# Patient Record
Sex: FEMALE | Race: WHITE
[De-identification: names, ages, dates, MRNs, and addresses within clinical notes are randomized per-mention and may not be internally consistent; named-entity substitution may affect disease eponyms.]

---
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
## 2021-12-07 ENCOUNTER — HOSPITAL ENCOUNTER (EMERGENCY)
Dept: HOSPITAL 33 - ED | Age: 21
Discharge: HOME | End: 2021-12-07
Payer: SELF-PAY

## 2021-12-07 VITALS — DIASTOLIC BLOOD PRESSURE: 69 MMHG | HEART RATE: 82 BPM | SYSTOLIC BLOOD PRESSURE: 104 MMHG

## 2021-12-07 VITALS — OXYGEN SATURATION: 99 %

## 2021-12-07 DIAGNOSIS — R11.2: ICD-10-CM

## 2021-12-07 DIAGNOSIS — Z72.0: ICD-10-CM

## 2021-12-07 DIAGNOSIS — N39.0: ICD-10-CM

## 2021-12-07 DIAGNOSIS — K29.70: Primary | ICD-10-CM

## 2021-12-07 LAB
ALBUMIN SERPL-MCNC: 4.3 G/DL (ref 3.5–5)
ALP SERPL-CCNC: 49 U/L (ref 38–126)
ALT SERPL-CCNC: 17 U/L (ref 0–35)
ANION GAP SERPL CALC-SCNC: 12.8 MEQ/L (ref 5–15)
AST SERPL QL: 20 U/L (ref 14–36)
BASOPHILS # BLD AUTO: 0.04 10*3/UL (ref 0–0.4)
BASOPHILS NFR BLD AUTO: 0.3 % (ref 0–0.4)
BILIRUB BLD-MCNC: 0.4 MG/DL (ref 0.2–1.3)
BUN SERPL-MCNC: 11 MG/DL (ref 7–17)
CALCIUM SPEC-MCNC: 9.5 MG/DL (ref 8.4–10.2)
CHLORIDE SERPL-SCNC: 102 MMOL/L (ref 98–107)
CO2 SERPL-SCNC: 25 MMOL/L (ref 22–30)
CREAT SERPL-MCNC: 0.91 MG/DL (ref 0.52–1.04)
EOSINOPHIL # BLD AUTO: 0.11 10*3/UL (ref 0–0.5)
GFR SERPLBLD BASED ON 1.73 SQ M-ARVRAT: > 60 ML/MIN
GLUCOSE SERPL-MCNC: 112 MG/DL (ref 74–106)
GLUCOSE UR-MCNC: NEGATIVE MG/DL
HCT VFR BLD AUTO: 38.6 % (ref 35–47)
HGB BLD-MCNC: 12.2 GM/DL (ref 12–16)
LIPASE SERPL-CCNC: 105 U/L (ref 23–300)
LYMPHOCYTES # SPEC AUTO: 4.41 10*3/UL (ref 1–4.6)
MCH RBC QN AUTO: 28.2 PG (ref 26–32)
MCHC RBC AUTO-ENTMCNC: 31.6 G/DL (ref 32–36)
MONOCYTES # BLD AUTO: 0.82 10*3/UL (ref 0–1.3)
PLATELET # BLD AUTO: 326 K/MM3 (ref 150–450)
POTASSIUM SERPLBLD-SCNC: 3.8 MMOL/L (ref 3.5–5.1)
PROT SERPL-MCNC: 7.1 G/DL (ref 6.3–8.2)
PROT UR STRIP-MCNC: 30 MG/DL
RBC # BLD AUTO: 4.33 M/MM3 (ref 4.1–5.4)
RBC #/AREA URNS HPF: (no result) /HPF (ref 0–2)
SODIUM SERPL-SCNC: 136 MMOL/L (ref 137–145)
WBC # BLD AUTO: 12 K/MM3 (ref 4–10.5)
WBC #/AREA URNS HPF: (no result) /HPF (ref 0–5)

## 2021-12-07 PROCEDURE — 36415 COLL VENOUS BLD VENIPUNCTURE: CPT

## 2021-12-07 PROCEDURE — 96360 HYDRATION IV INFUSION INIT: CPT

## 2021-12-07 PROCEDURE — 84703 CHORIONIC GONADOTROPIN ASSAY: CPT

## 2021-12-07 PROCEDURE — 99284 EMERGENCY DEPT VISIT MOD MDM: CPT

## 2021-12-07 PROCEDURE — 83690 ASSAY OF LIPASE: CPT

## 2021-12-07 PROCEDURE — 87086 URINE CULTURE/COLONY COUNT: CPT

## 2021-12-07 PROCEDURE — 81001 URINALYSIS AUTO W/SCOPE: CPT

## 2021-12-07 PROCEDURE — 80053 COMPREHEN METABOLIC PANEL: CPT

## 2021-12-07 PROCEDURE — 96375 TX/PRO/DX INJ NEW DRUG ADDON: CPT

## 2021-12-07 PROCEDURE — 96374 THER/PROPH/DIAG INJ IV PUSH: CPT

## 2021-12-07 PROCEDURE — 36000 PLACE NEEDLE IN VEIN: CPT

## 2021-12-07 PROCEDURE — 85025 COMPLETE CBC W/AUTO DIFF WBC: CPT

## 2021-12-07 NOTE — ERPHSYRPT
- History of Present Illness


Time Seen by Provider: 12/07/21 02:10


Source: patient


Exam Limitations: no limitations


Patient Subjective Stated Complaint: pt states "I have been vomiting for the 

past 2 weeks."


Triage Nursing Assessment: pt ambulated into the er; pt is axo x4; c/o vomiting;

pt denies abd pain; pt states vomiting for the past 2 weeks; pt denies following

up with PCP; pt denies N/D; abd is round and soft; pt states last BM was 12/5; 

active bowel sounds in all quads; vitals wnl


Physician History: 


Patient is a 21-year-old female presents to our ED for evaluation of 

intermittent nausea vomiting for 2 weeks.  Patient states she was at work and 

vomited.  Patient states that her boss advised her to come to the ER to get 

checked out.  Patient denies pain.  No abdominal pain.  No chest pain.  No 

diarrhea.  No rash.  No fever.  No trauma.  Symptoms are mild to moderate in 

intensity.  No specific worsening or improving factors.  No active bleeding.  

Patient is otherwise healthy.  No significant past medical history.  Patient is 

not on blood thinners.  Patient voices no other complaints or concerns at this 

time.





Timing/Duration: today


Severity: mild


Modifying Factors: Improves With: nothing


Associated Symptoms: denies symptoms


Allergies/Adverse Reactions: 








No Known Drug Allergies Allergy (Verified 12/07/21 02:04)


   





Home Medications: 








Norethindrone AC-Eth Estradiol [Microgestin] 1 each PO DAILY 12/07/21 [History]





Hx Tetanus, Diphtheria Vaccination/Date Given: Yes


Hx Influenza Vaccination/Date Given: No


Hx Pneumococcal Vaccination/Date Given: No





Travel Risk





- International Travel


Have you traveled outside of the country in past 3 weeks: No





- Coronavirus Screening


Are you exhibiting any of the following symptoms?: Yes


Symptoms: Vomiting/Diarrhea


Close contact with a COVID-19 positive Pt in past 14-21 Days: No





- Vaccine Status


Have you recieved a Covid-19 vaccination: Yes


: Moderna





- Vaccination Dates


Date of 2cond Vaccination (if applicable): 02/21





- Review of Systems


Constitutional: No Symptoms, No Fever, No Chills


Eyes: No Symptoms


Ears, Nose, & Throat: No Symptoms


Respiratory: No Symptoms, No Cough, No Dyspnea


Cardiac: No Symptoms, No Chest Pain, No Edema, No Syncope


Abdominal/Gastrointestinal: No Symptoms, No Abdominal Pain, No Nausea, No 

Vomiting, No Diarrhea


Genitourinary Symptoms: No Symptoms, No Dysuria


Musculoskeletal: No Symptoms, No Back Pain, No Neck Pain


Skin: No Symptoms, No Rash


Neurological: No Symptoms, No Dizziness, No Focal Weakness, No Sensory Changes


Psychological: No Symptoms


Endocrine: No Symptoms


Hematologic/Lymphatic: No Symptoms


Immunological/Allergic: No Symptoms


All Other Systems: Reviewed and Negative





- Past Medical History


Pertinent Past Medical History: No


Neurological History: No Pertinent History


ENT History: No Pertinent History


Cardiac History: No Pertinent History


Respiratory History: No Pertinent History


Endocrine Medical History: No Pertinent History


Musculoskeletal History: No Pertinent History


GI Medical History: No Pertinent History


 History: No Pertinent History


Psycho-Social History: Anxiety, Depression


Female Reproductive Disorders: No Pertinent History





- Past Surgical History


Past Surgical History: No





- Social History


Smoking Status: Current every day smoker


How long have you smoked: 3 years


Exposure to second hand smoke: Yes


Drug Use: none


Patient Lives Alone: No





- Female History


Hx Pregnant Now: No





- Nursing Vital Signs


Nursing Vital Signs: 


                               Initial Vital Signs











Temperature  98.1 F   12/07/21 02:06


 


Pulse Rate  103 H  12/07/21 02:06


 


Respiratory Rate  18   12/07/21 02:06


 


Blood Pressure  132/95   12/07/21 02:06


 


O2 Sat by Pulse Oximetry  99   12/07/21 02:06








                                   Pain Scale











Pain Intensity                 0

















- Physical Exam


General Appearance: no apparent distress, alert


Eye Exam: PERRL/EOMI, eyes nml inspection


Ears, Nose, Throat Exam: normal ENT inspection, TMs normal, pharynx normal, 

moist mucous membranes


Neck Exam: normal inspection, non-tender, supple, full range of motion


Respiratory Exam: normal breath sounds, lungs clear, airway intact, No 

respiratory distress


Cardiovascular Exam: regular rate/rhythm, normal heart sounds, normal peripheral

 pulses


Gastrointestinal/Abdomen Exam: soft, normal bowel sounds, No tenderness, No mass


Back Exam: normal inspection, normal range of motion, No CVA tenderness, No 

vertebral tenderness


Extremity Exam: normal inspection, normal range of motion, pelvis stable


Neurologic Exam: alert, oriented x 3, cooperative, normal mood/affect, nml 

cerebellar function, nml station & gait, sensation nml, No motor deficits


Skin Exam: normal color, warm, dry, No rash


Lymphatic Exam: No adenopathy


**SpO2 Interpretation**: normal


SpO2: 99


O2 Delivery: Room Air





- Course


Nursing assessment & vital signs reviewed: Yes


Ordered Tests: 


                               Active Orders 24 hr











 Category Date Time Status


 


 IV Insertion STAT Care  12/07/21 02:26 Active


 


 CBC W DIFF Stat Lab  12/07/21 02:26 Completed


 


 CMP Stat Lab  12/07/21 02:26 Completed


 


 CULTURE,URINE Stat Lab  12/07/21 02:26 Received


 


 HCG,QUALITATIVE URINE Stat Lab  12/07/21 02:26 Completed


 


 LIPASE Stat Lab  12/07/21 02:26 Completed


 


 UA W/RFX UR CULTURE Stat Lab  12/07/21 02:26 Completed








Medication Summary











Generic Name Dose Route Start Last Admin





  Trade Name Freq  PRN Reason Stop Dose Admin


 


Sodium Chloride  1,000 mls @ 999 mls/hr  12/07/21 02:26  12/07/21 02:39





  Sodium Chloride 0.9% 1000 Ml  IV  12/07/21 03:26  999 mls/hr





  .Q1H1M STA   Administration














Discontinued Medications














Generic Name Dose Route Start Last Admin





  Trade Name Freq  PRN Reason Stop Dose Admin


 


Sodium Chloride  Confirm  12/07/21 02:39 





  Sodium Chloride 0.9% 1000 Ml  Administered  12/07/21 02:40 





  Dose  





  1,000 mls @ ud  





  .ROUTE  





  .STK-MED ONE  


 


Ondansetron HCl  4 mg  12/07/21 02:26  12/07/21 02:38





  Ondansetron Hcl 4 Mg/2 Ml Vial  IV  12/07/21 02:27  4 mg





  STAT ONE   Administration


 


Ondansetron HCl  Confirm  12/07/21 02:27 





  Ondansetron Hcl 4 Mg/2 Ml Vial  Administered  12/07/21 02:28 





  Dose  





  4 mg  





  .ROUTE  





  .STK-MED ONE  


 


Pantoprazole Sodium  40 mg  12/07/21 02:26  12/07/21 02:38





  Pantoprazole 40 Mg Vial  IV  12/07/21 02:27  40 mg





  STAT ONE   Administration


 


Pantoprazole Sodium  Confirm  12/07/21 02:27 





  Pantoprazole 40 Mg Vial  Administered  12/07/21 02:28 





  Dose  





  40 mg  





  IV  





  .STK-MED ONE  











Lab/Rad Data: 


                           Laboratory Result Diagrams





                                 12/07/21 02:26 





                                 12/07/21 02:26 





                               Laboratory Results











  12/07/21 12/07/21 12/07/21 Range/Units





  02:26 02:26 02:26 


 


WBC   12.0 H   (4.0-10.5)  K/mm3


 


RBC   4.33   (4.1-5.4)  M/mm3


 


Hgb   12.2   (12.0-16.0)  gm/dl


 


Hct   38.6   (35-47)  %


 


MCV   89.1   ()  fl


 


MCH   28.2   (26-32)  pg


 


MCHC   31.6 L   (32-36)  g/dl


 


RDW   12.8   (11.5-14.0)  %


 


Plt Count   326   (150-450)  K/mm3


 


MPV   9.7   (7.5-11.0)  fl


 


Gran %   55.0   (36.0-66.0)  %


 


Eos # (Auto)   0.11   (0-0.5)  


 


Absolute Lymphs (auto)   4.41   (1.0-4.6)  


 


Absolute Monos (auto)   0.82   (0.0-1.3)  


 


Lymphocytes %   36.9   (24.0-44.0)  %


 


Monocytes %   6.9   (0.0-12.0)  %


 


Eosinophils %   0.9   (0.00-5.0)  %


 


Basophils %   0.3   (0.0-0.4)  %


 


Absolute Granulocytes   6.57   (1.4-6.9)  


 


Basophils #   0.04   (0-0.4)  


 


Sodium  136 L    (137-145)  mmol/L


 


Potassium  3.8    (3.5-5.1)  mmol/L


 


Chloride  102    ()  mmol/L


 


Carbon Dioxide  25    (22-30)  mmol/L


 


Anion Gap  12.8    (5-15)  MEQ/L


 


BUN  11    (7-17)  mg/dL


 


Creatinine  0.91    (0.52-1.04)  mg/dL


 


Estimated GFR  > 60.0    ML/MIN


 


Glucose  112 H    ()  mg/dL


 


Calcium  9.5    (8.4-10.2)  mg/dL


 


Total Bilirubin  0.40    (0.2-1.3)  mg/dL


 


AST  20    (14-36)  U/L


 


ALT  17    (0-35)  U/L


 


Alkaline Phosphatase  49    ()  U/L


 


Serum Total Protein  7.1    (6.3-8.2)  g/dL


 


Albumin  4.3    (3.5-5.0)  g/dL


 


Lipase  105    ()  U/L


 


Urine Color     (YELLOW)  


 


Urine Appearance     (CLEAR)  


 


Urine pH     (5-6)  


 


Ur Specific Gravity     (1.005-1.025)  


 


Urine Protein     (Negative)  


 


Urine Ketones     (NEGATIVE)  


 


Urine Blood     (0-5)  Zion/ul


 


Urine Nitrite     (NEGATIVE)  


 


Urine Bilirubin     (NEGATIVE)  


 


Urine Urobilinogen     (0-1)  mg/dL


 


Ur Leukocyte Esterase     (NEGATIVE)  


 


Urine WBC (Auto)     (0-5)  /HPF


 


Urine RBC (Auto)     (0-2)  /HPF


 


U Epithel Cells (Auto)     (FEW)  /HPF


 


Urine Bacteria (Auto)     (NEGATIVE)  /HPF


 


Urine Mucus (Auto)     (NEGATIVE)  /HPF


 


Urine Culture Reflexed     (NO)  


 


Urine Glucose     (NEGATIVE)  mg/dL


 


Urine HCG, Qual    NEGATIVE  (Negative)  














  12/07/21 Range/Units





  02:26 


 


WBC   (4.0-10.5)  K/mm3


 


RBC   (4.1-5.4)  M/mm3


 


Hgb   (12.0-16.0)  gm/dl


 


Hct   (35-47)  %


 


MCV   ()  fl


 


MCH   (26-32)  pg


 


MCHC   (32-36)  g/dl


 


RDW   (11.5-14.0)  %


 


Plt Count   (150-450)  K/mm3


 


MPV   (7.5-11.0)  fl


 


Gran %   (36.0-66.0)  %


 


Eos # (Auto)   (0-0.5)  


 


Absolute Lymphs (auto)   (1.0-4.6)  


 


Absolute Monos (auto)   (0.0-1.3)  


 


Lymphocytes %   (24.0-44.0)  %


 


Monocytes %   (0.0-12.0)  %


 


Eosinophils %   (0.00-5.0)  %


 


Basophils %   (0.0-0.4)  %


 


Absolute Granulocytes   (1.4-6.9)  


 


Basophils #   (0-0.4)  


 


Sodium   (137-145)  mmol/L


 


Potassium   (3.5-5.1)  mmol/L


 


Chloride   ()  mmol/L


 


Carbon Dioxide   (22-30)  mmol/L


 


Anion Gap   (5-15)  MEQ/L


 


BUN   (7-17)  mg/dL


 


Creatinine   (0.52-1.04)  mg/dL


 


Estimated GFR   ML/MIN


 


Glucose   ()  mg/dL


 


Calcium   (8.4-10.2)  mg/dL


 


Total Bilirubin   (0.2-1.3)  mg/dL


 


AST   (14-36)  U/L


 


ALT   (0-35)  U/L


 


Alkaline Phosphatase   ()  U/L


 


Serum Total Protein   (6.3-8.2)  g/dL


 


Albumin   (3.5-5.0)  g/dL


 


Lipase   ()  U/L


 


Urine Color  THERESA  (YELLOW)  


 


Urine Appearance  CLOUDY  (CLEAR)  


 


Urine pH  5.0  (5-6)  


 


Ur Specific Gravity  1.031  (1.005-1.025)  


 


Urine Protein  30  (Negative)  


 


Urine Ketones  NEGATIVE  (NEGATIVE)  


 


Urine Blood  SMALL  (0-5)  Zion/ul


 


Urine Nitrite  NEGATIVE  (NEGATIVE)  


 


Urine Bilirubin  NEGATIVE  (NEGATIVE)  


 


Urine Urobilinogen  NEGATIVE  (0-1)  mg/dL


 


Ur Leukocyte Esterase  MODERATE  (NEGATIVE)  


 


Urine WBC (Auto)  6-10  (0-5)  /HPF


 


Urine RBC (Auto)  0-2  (0-2)  /HPF


 


U Epithel Cells (Auto)  MANY  (FEW)  /HPF


 


Urine Bacteria (Auto)  MODERATE  (NEGATIVE)  /HPF


 


Urine Mucus (Auto)  SLIGHT  (NEGATIVE)  /HPF


 


Urine Culture Reflexed  YES  (NO)  


 


Urine Glucose  NEGATIVE  (NEGATIVE)  mg/dL


 


Urine HCG, Qual   (Negative)  














- Progress


Progress: improved


Progress Note: 


Patient reassessed.  She feels well.  Patient tolerated p.o.  Working diagnosis 

at this time is gastritis.  Patient also has a urinary tract infection.  A 

prescription for Macrobid, Protonix and Zofran was forwarded to patient's 

pharmacy.  Patient understands that she is to maintain a clear liquid diet for 

the next several days.  Patient agrees to follow-up with her primary care doctor

 within 48 hours for reevaluation.  She voices no other complaints or concerns 

at this time.





Portions of this note were created with voice recognition technology.  There may

 be grammatical, spelling, punctuation or sound alike errors


12/07/21 03:25





Counseled pt/family regarding: lab results, diagnosis, need for follow-up





- Departure


Departure Disposition: Home


Clinical Impression: 


 UTI (urinary tract infection), Nausea and vomiting, Gastritis





Condition: Stable


Critical Care Time: No


Referrals: 


GERALD GUARDADO MD [Primary Care Provider] - Follow up/PCP as directed


Additional Instructions: 


Discharge/Care Plan





NOEMIRENEE CORONEL was seen on 12/07/21 in the Emergency Room. The patient 

was counseled regarding Diagnosis,Lab results, Imaging studies, need for follow 

up and when to return to the Emergency Room.





Prescriptions given:





Discharge Note





I have spoken with the patient and/or caregivers. I have explained the patient's

condition, diagnosis and treatment plan based on the information available to me

at this time. I have answered the patient's and/or caregiver's questions and 

addressed any concerns. The patient and/or caregivers have as good understanding

of the patient's diagnosis, condition and treatment plan as can be expected at 

this point. The vital signs have been stable. The patient's condition is stable 

and appropriate for discharge from the emergency department.





The patient will pursue further outpatient evaluation with the primary care 

physician or other designated or consulting physician as outlined in the 

discharge instructions. The patient and/or caregivers are agreeable to this plan

of care and follow-up instructions have been explained in detail. The patient 

and/or caregivers have received these instruction. The patient/and or caregivers

are aware that any significant change in condition or worsening of symptoms 

should prompt an immediate return to this or the closest emergency department or

call 911. 








Prescriptions: 


Ondansetron ODT 4 MG*** [Zofran Odt 4 mg***] 4 mg PO Q6H PRN PRN #10 tablet


 PRN Reason: Vomiting


Nitrofurantoin Macro 100 mg*** [Macrobid 100MG Capsule***] 100 mg PO BID 7 Days 

#14 cap


Famotidine 20 mg*** [Pepcid 20 MG***] 20 mg PO BID 14 Days #28 tablet

## 2024-11-15 ENCOUNTER — HOSPITAL ENCOUNTER (EMERGENCY)
Dept: HOSPITAL 33 - ED | Age: 24
Discharge: HOME | End: 2024-11-15
Payer: MEDICAID

## 2024-11-15 VITALS — HEART RATE: 75 BPM | DIASTOLIC BLOOD PRESSURE: 61 MMHG | SYSTOLIC BLOOD PRESSURE: 116 MMHG | OXYGEN SATURATION: 97 %

## 2024-11-15 VITALS — TEMPERATURE: 98 F | RESPIRATION RATE: 18 BRPM

## 2024-11-15 DIAGNOSIS — H92.01: ICD-10-CM

## 2024-11-15 DIAGNOSIS — Z33.1: ICD-10-CM

## 2024-11-15 DIAGNOSIS — H66.001: Primary | ICD-10-CM

## 2024-11-15 PROCEDURE — 99284 EMERGENCY DEPT VISIT MOD MDM: CPT

## 2024-11-15 PROCEDURE — 99283 EMERGENCY DEPT VISIT LOW MDM: CPT

## 2024-11-15 PROCEDURE — 96372 THER/PROPH/DIAG INJ SC/IM: CPT

## 2024-11-15 RX ADMIN — NEOMYCIN SULFATE, POLYMYXIN B SULFATE AND HYDROCORTISONE SCH ML: 10; 3.5; 1 SUSPENSION/ DROPS AURICULAR (OTIC) at 01:43

## 2024-11-15 RX ADMIN — CEFTRIAXONE SODIUM ONE MG: 1 INJECTION, POWDER, FOR SOLUTION INTRAMUSCULAR; INTRAVENOUS at 01:41

## 2025-01-19 ENCOUNTER — HOSPITAL ENCOUNTER (OUTPATIENT)
Dept: HOSPITAL 33 - ED | Age: 25
Setting detail: OBSERVATION
LOS: 1 days | Discharge: HOME | End: 2025-01-20
Attending: FAMILY MEDICINE | Admitting: FAMILY MEDICINE
Payer: COMMERCIAL

## 2025-01-19 DIAGNOSIS — R06.02: ICD-10-CM

## 2025-01-19 DIAGNOSIS — Z3A.26: ICD-10-CM

## 2025-01-19 DIAGNOSIS — R42: ICD-10-CM

## 2025-01-19 DIAGNOSIS — O21.2: Primary | ICD-10-CM

## 2025-01-19 LAB
ALBUMIN SERPL-MCNC: 3.7 G/DL (ref 3.5–5)
ALP SERPL-CCNC: 95 U/L (ref 38–126)
ALT SERPL-CCNC: 19 U/L (ref 0–35)
AMYLASE SERPL-CCNC: 45 U/L (ref 30–110)
ANION GAP SERPL CALC-SCNC: 12.9 MEQ/L (ref 5–15)
AST SERPL QL: 28 U/L (ref 14–36)
BASOPHILS # BLD AUTO: 0.03 X10^3/UL (ref 0.01–0.08)
BASOPHILS NFR BLD AUTO: 0.3 % (ref 0.1–1.2)
BILIRUB BLD-MCNC: 0.8 MG/DL (ref 0.2–1.3)
BUN SERPL-MCNC: 3 MG/DL (ref 7–17)
CALCIUM SPEC-MCNC: 9.8 MG/DL (ref 8.4–10.2)
CHLORIDE SERPL-SCNC: 107 MMOL/L (ref 98–107)
CO2 SERPL-SCNC: 19 MMOL/L (ref 22–30)
CREAT SERPL-MCNC: 0.51 MG/DL (ref 0.52–1.04)
EOSINOPHIL # BLD AUTO: 0.03 X10^3/UL (ref 0.04–0.36)
FLUAV AG NPH QL IA: NEGATIVE
FLUBV AG NPH QL IA: NEGATIVE
GFR SERPLBLD BASED ON 1.73 SQ M-ARVRAT: 131.9 ML/MIN
GLUCOSE SERPL-MCNC: 102 MG/DL (ref 74–106)
HCT VFR BLD AUTO: 37 % (ref 34.1–44.9)
HGB BLD-MCNC: 12.5 G/DL (ref 11.2–15.7)
IMM GRANULOCYTES # BLD: 0.06 X10^3U/L (ref 0–0.03)
IMM GRANULOCYTES NFR BLD: 0.5 % (ref 0–0.43)
LIPASE SERPL-CCNC: 78 U/L (ref 23–300)
LYMPHOCYTES # SPEC AUTO: 1.26 X10^3/UL (ref 1.18–3.74)
MCH RBC QN AUTO: 29.8 PG (ref 25.6–32.2)
MCHC RBC AUTO-ENTMCNC: 33.8 G/DL (ref 32.2–35.5)
MONOCYTES # BLD AUTO: 0.35 X10^3/UL (ref 0.24–0.86)
NRBC # BLD AUTO: 0 X10^3U/L (ref 0–0.01)
NRBC BLD AUTO-RTO: 0 % (ref 0–0.2)
PLATELET # BLD AUTO: 308 X10^3/UL (ref 182–369)
POTASSIUM SERPLBLD-SCNC: 3.3 MMOL/L (ref 3.5–5.1)
PROT SERPL-MCNC: 6.7 G/DL (ref 6.3–8.2)
RBC # BLD AUTO: 4.19 X10^6/UL (ref 3.93–5.22)
RBC # URNS HPF: (no result) /HPF (ref 0–5)
RSV AG SPEC QL IA: NEGATIVE
SARS-COV-2 AG RESP QL IA.RAPID: NEGATIVE
SODIUM SERPL-SCNC: 136 MMOL/L (ref 135–145)
WBC # BLD AUTO: 11.2 X10^3/UL (ref 3.98–10.04)
WBC URNS QL MICRO: (no result) /HPF (ref 0–5)

## 2025-01-19 PROCEDURE — 85025 COMPLETE CBC W/AUTO DIFF WBC: CPT

## 2025-01-19 PROCEDURE — 36415 COLL VENOUS BLD VENIPUNCTURE: CPT

## 2025-01-19 PROCEDURE — 96375 TX/PRO/DX INJ NEW DRUG ADDON: CPT

## 2025-01-19 PROCEDURE — 83880 ASSAY OF NATRIURETIC PEPTIDE: CPT

## 2025-01-19 PROCEDURE — G0378 HOSPITAL OBSERVATION PER HR: HCPCS

## 2025-01-19 PROCEDURE — 84484 ASSAY OF TROPONIN QUANT: CPT

## 2025-01-19 PROCEDURE — 0241U: CPT

## 2025-01-19 PROCEDURE — 99213 OFFICE O/P EST LOW 20 MIN: CPT

## 2025-01-19 PROCEDURE — 96374 THER/PROPH/DIAG INJ IV PUSH: CPT

## 2025-01-19 PROCEDURE — 99285 EMERGENCY DEPT VISIT HI MDM: CPT

## 2025-01-19 PROCEDURE — 83605 ASSAY OF LACTIC ACID: CPT

## 2025-01-19 PROCEDURE — 96376 TX/PRO/DX INJ SAME DRUG ADON: CPT

## 2025-01-19 PROCEDURE — 85379 FIBRIN DEGRADATION QUANT: CPT

## 2025-01-19 PROCEDURE — 93005 ELECTROCARDIOGRAM TRACING: CPT

## 2025-01-19 PROCEDURE — 83690 ASSAY OF LIPASE: CPT

## 2025-01-19 PROCEDURE — 82150 ASSAY OF AMYLASE: CPT

## 2025-01-19 PROCEDURE — 81001 URINALYSIS AUTO W/SCOPE: CPT

## 2025-01-19 PROCEDURE — 80053 COMPREHEN METABOLIC PANEL: CPT

## 2025-01-19 PROCEDURE — 59025 FETAL NON-STRESS TEST: CPT

## 2025-01-19 RX ADMIN — PROMETHAZINE HYDROCHLORIDE ONE MG: 25 TABLET ORAL at 20:07

## 2025-01-19 RX ADMIN — DIPHENHYDRAMINE HYDROCHLORIDE ONE MG: 50 INJECTION INTRAMUSCULAR; INTRAVENOUS at 20:04

## 2025-01-19 RX ADMIN — POTASSIUM CHLORIDE ONE MLS/HR: 14.9 INJECTION, SOLUTION INTRAVENOUS at 23:05

## 2025-01-19 RX ADMIN — PROMETHAZINE HYDROCHLORIDE ONE MG: 25 TABLET ORAL at 22:53

## 2025-01-19 NOTE — ERPHSYRPT
- History of Present Illness


Time Seen by Provider: 01/19/25 19:40


Source: patient, family


Exam Limitations: no limitations


Physician History: 





Pt had onset of   vomiting and nasal congest, short of breath  past 2 days - 

dizziness today - no abd pain or bleeding or discharg but is 26 weeks pregnant 

and due in APril.


Chest is clear, ht reg without m abd nontender gravid uterus, 


    


Discussed with pt and available family risks and benefits of testing/Tx in

cluding  CBC, CMP, EKG, Trop, BNP, D-dimer, UA, Amylase, Lipase,  \  swabs for 

Covid, RSV, Flu and Strep,   and they wish to proceed so these are ordered. 





family is here in ER as independent source for Hx. 





  Results discussed with pt and available family. 


Timing/Duration: yesterday


Severity: moderate


Associated Symptoms: nausea, vomiting, shortness of breath


Allergies/Adverse Reactions: 








No Known Drug Allergies Allergy (Verified 11/15/24 01:19)


   





Home Medications: 








Prenatal Vit No.179/Iron/Folic [Prenatal Tablet] 1 each PO DAILY 11/15/24 

[History]


Doxylamine Succinate [Unisom] 1 tab PO PRN 01/19/25 [History]


Famotidine [Pepcid AC] 1 tab PO DAILY 01/19/25 [History]


Ondansetron ODT 4 MG*** [Zofran Odt 4 mg***] 1 tab PO PRN 01/19/25 [History]





Hx Tetanus, Diphtheria Vaccination/Date Given: Yes


Hx Influenza Vaccination/Date Given: No


Hx Pneumococcal Vaccination/Date Given: No





Travel Risk





- Emerging Infectious Disease


Are you exhibiting symptoms associated with any current EIDs: No





- Review of Systems


Constitutional: No Fever, No Chills


Eyes: No Symptoms


Ears, Nose, & Throat: No Symptoms


Respiratory: Dyspnea, No Cough


Cardiac: No Chest Pain, No Edema, No Syncope


Abdominal/Gastrointestinal: Nausea, Vomiting, No Abdominal Pain, No Diarrhea


Genitourinary Symptoms: No Dysuria


Musculoskeletal: No Back Pain, No Neck Pain


Skin: No Rash


Neurological: Dizziness, No Focal Weakness, No Sensory Changes


Psychological: No Symptoms


Endocrine: No Symptoms


Hematologic/Lymphatic: No Symptoms


Immunological/Allergic: No Symptoms


All Other Systems: Reviewed and Negative





- Past Medical History


Pertinent Past Medical History: No


Neurological History: No Pertinent History


ENT History: No Pertinent History


Cardiac History: No Pertinent History


Respiratory History: No Pertinent History


Endocrine Medical History: No Pertinent History


Musculoskeletal History: No Pertinent History


GI Medical History: No Pertinent History


 History: No Pertinent History


Psycho-Social History: Anxiety, Depression


Female Reproductive Disorders: No Pertinent History





- Past Surgical History


Past Surgical History: No





- Social History


Smoking Status: Smoker, status unknown


How long have you smoked: 3 years


Exposure to second hand smoke: Yes


Drug Use: none


Patient Lives Alone: No





- Social Determinants of Health


Will the patient participate in the screening: Yes


Do you worry about a steady place to live?: No


In the past 12 months,have you had to go without utilities?: No


Transportation Issues: No


Has anyone in your support network made you feel unsafe?: No


Have you or anyone in your house had to go without enough: No





- Nursing Vital Signs


Nursing Vital Signs: 


                               Initial Vital Signs











Temperature  98.5 F   01/19/25 19:36


 


Pulse Rate  111 H  01/19/25 19:36


 


Respiratory Rate  18   01/19/25 19:36


 


Blood Pressure  122/71   01/19/25 19:36


 


O2 Sat by Pulse Oximetry  100   01/19/25 19:36








                                   Pain Scale











Pain Intensity                 0

















- Physical Exam


General Appearance: no apparent distress, alert


Eye Exam: PERRL/EOMI, eyes nml inspection


Ears, Nose, Throat Exam: normal ENT inspection, TMs normal, pharynx normal, 

moist mucous membranes


Neck Exam: normal inspection, non-tender, supple, full range of motion


Respiratory Exam: normal breath sounds, lungs clear, No respiratory distress


Cardiovascular Exam: regular rate/rhythm, normal heart sounds, normal peripheral

 pulses


Gastrointestinal/Abdomen Exam: soft, normal bowel sounds, No tenderness, No mass


Pelvic Exam: deferred


Rectal Exam: deferred


Back Exam: normal inspection, normal range of motion, No CVA tenderness, No 

vertebral tenderness


Extremity Exam: normal inspection, normal range of motion, pelvis stable


Neurologic Exam: alert, oriented x 3, cooperative, normal mood/affect, nml 

cerebellar function, nml station & gait, sensation nml, No motor deficits


Skin Exam: normal color, warm, dry, No rash


Lymphatic Exam: No adenopathy


**SpO2 Interpretation**: normal


SpO2: 98


O2 Delivery: Room Air





- Course


Nursing assessment & vital signs reviewed: Yes


EKG Interpreted by Me: Sinus Tach, NORMAL AXIS, NORMAL INTERVALS, NORMAL QRS, 

Non-specific ST Changes


Ordered Tests: 


                               Active Orders 24 hr











 Category Date Time Status


 


 EKG-ER Only STAT Care  01/19/25 19:54 Active


 


 Fetal Heart Tones-ED STAT Care  01/19/25 20:04 Active


 


 IV Insertion STAT Care  01/19/25 19:54 Active


 


 Telemetry q4h Care  01/19/25 22:54 Active


 


 AMYLASE Stat Lab  01/19/25 20:12 Completed


 


 CBC W DIFF Stat Lab  01/19/25 20:12 Completed


 


 CMP Stat Lab  01/19/25 20:12 Completed


 


 D-DIMER QUANTITATIVE Stat Lab  01/19/25 20:16 Completed


 


 LIPASE Stat Lab  01/19/25 20:12 Completed


 


 Lactic Acid Stat Lab  01/19/25 19:54 Completed


 


 Lactic Acid Stat Lab  01/19/25 22:11 Completed


 


 NT PRO BNPII Stat Lab  01/19/25 20:16 Completed


 


 TROPONIN Q4H Lab  01/19/25 20:12 Completed


 


 UA W/RFX UR CULTURE Stat Lab  01/19/25 21:23 Completed








Medication Summary











Generic Name Dose Route Start Last Admin





  Trade Name Freq  PRN Reason Stop Dose Admin


 


Potassium Chloride  20 meq in 100 mls @ 50 mls/hr  01/19/25 22:54  01/19/25 23:

05





  Potassium Chloride 20 Meq In Water 100ml  IV  01/20/25 00:53  50 mls/hr





  STAT ONE   Administration


 


Sodium Chloride  500 mls @ 100 mls/hr  01/19/25 23:15  01/19/25 23:04





  Sodium Chloride 0.9% 500 Ml  IV  02/18/25 23:14  100 mls/hr





  .Q5H DEWEY   Administration


 


Sodium Chloride  1,000 mls @ 999 mls/hr  01/20/25 00:14  01/20/25 00:15





  Sodium Chloride 0.9% 1000 Ml  IV  01/20/25 01:14  999 mls/hr





  .Q1H1M STA   Administration














Discontinued Medications














Generic Name Dose Route Start Last Admin





  Trade Name Freq  PRN Reason Stop Dose Admin


 


Diphenhydramine HCl  25 mg  01/19/25 19:54  01/19/25 20:04





  Diphenhydramine Hcl 50 Mg/Ml Vial  IV  01/19/25 19:55  25 mg





  STAT ONE   Administration


 


Diphenhydramine HCl  Confirm  01/19/25 20:02 





  Diphenhydramine Hcl 50 Mg/Ml Vial  Administered  01/19/25 20:03 





  Dose  





  50 mg  





  .ROUTE  





  .STK-MED ONE  


 


Sodium Chloride  1,000 mls @ 999 mls/hr  01/19/25 19:54  01/19/25 22:20





  Sodium Chloride 0.9% 1000 Ml  IV  01/19/25 20:54  Infused





  .Q1H1M STA   Infusion


 


Sodium Chloride  Confirm  01/19/25 20:02 





  Sodium Chloride 0.9% 1000 Ml  Administered  01/19/25 20:03 





  Dose  





  1,000 mls @ ud  





  .ROUTE  





  .STK-MED ONE  


 


Sodium Chloride  Confirm  01/19/25 21:28 





  Sodium Chloride 0.9% 1000 Ml  Administered  01/19/25 21:29 





  Dose  





  1,000 mls @ ud  





  .ROUTE  





  .STK-MED ONE  


 


Sodium Chloride  1,000 mls @ 999 mls/hr  01/19/25 21:34  01/19/25 21:36





  Sodium Chloride 0.9% 1000 Ml  IV  01/19/25 22:34  999 mls/hr





  .Q1H1M STA   Administration


 


Sodium Chloride  Confirm  01/19/25 23:00 





  Sodium Chloride 0.9% 500 Ml  Administered  01/19/25 23:01 





  Dose  





  500 mls @ ud  





  IV  





  .STK-MED ONE  


 


Potassium Chloride  Confirm  01/19/25 23:00 





  Potassium Chloride 20 Meq In Water 100ml  Administered  01/19/25 23:01 





  Dose  





  100 mls @ ud  





  IV  





  .STK-MED ONE  


 


Sodium Chloride  Confirm  01/20/25 00:14 





  Sodium Chloride 0.9% 1000 Ml  Administered  01/20/25 00:15 





  Dose  





  1,000 mls @ ud  





  .ROUTE  





  .STK-MED ONE  


 


Ondansetron HCl  4 mg  01/20/25 00:22  01/20/25 00:23





  Ondansetron Hcl 4 Mg/2 Ml Vial  IV  01/20/25 00:23  4 mg





  STAT ONE   Administration


 


Ondansetron HCl  Confirm  01/20/25 00:22 





  Ondansetron Hcl 4 Mg/2 Ml Vial  Administered  01/20/25 00:23 





  Dose  





  4 mg  





  .ROUTE  





  .STK-MED ONE  


 


Promethazine HCl  25 mg  01/19/25 20:00  01/19/25 20:07





  Promethazine Hcl 25 Mg Tablet  PO  01/19/25 20:01  25 mg





  STAT ONE   Administration


 


Promethazine HCl  Confirm  01/19/25 20:06 





  Promethazine Hcl 25 Mg Tablet  Administered  01/19/25 20:07 





  Dose  





  25 mg  





  .ROUTE  





  .STK-MED ONE  


 


Promethazine HCl  Confirm  01/19/25 20:35 





  Promethazine Hcl 25 Mg Tablet  Administered  01/19/25 20:36 





  Dose  





  25 mg  





  .ROUTE  





  .STK-MED ONE  


 


Promethazine HCl  25 mg  01/19/25 22:49  01/19/25 22:53





  Promethazine Hcl 25 Mg Tablet  PO  01/19/25 22:50  25 mg





  STAT ONE   Administration


 


Promethazine HCl  Confirm  01/19/25 22:52 





  Promethazine Hcl 25 Mg Tablet  Administered  01/19/25 22:53 





  Dose  





  25 mg  





  .ROUTE  





  .ST-MED ONE  











Lab/Rad Data: 


                           Laboratory Result Diagrams





                                 01/19/25 20:12 





                                 01/19/25 20:12 





                               Laboratory Results











  01/19/25 01/19/25 01/19/25 Range/Units





  22:55 22:11 21:23 


 


WBC     (3.98-10.04)  x10^3/uL


 


RBC     (3.93-5.22)  x10^6/uL


 


Hgb     (11.2-15.7)  g/dL


 


Hct     (34.1-44.9)  %


 


MCV     (79.4-94.8)  fL


 


MCH     (25.6-32.2)  pg


 


MCHC     (32.2-35.5)  g/dL


 


RDW     (11.7-14.4)  %


 


Plt Count     (182-369)  x10^3/uL


 


MPV     (9.4-12.3)  fL


 


Gran %     (34.0-71.1)  %


 


Immature Gran % (Auto)     (0.001-0.429)  %


 


Nucleat RBC Rel Count     (0.00-0.2)  %


 


Eos # (Auto)     (0.04-0.36)  x10^3/uL


 


Immature Gran # (Auto)     (0.001-0.031)  x10^3u/L


 


Absolute Lymphs (auto)     (1.18-3.74)  x10^3/uL


 


Absolute Monos (auto)     (0.24-0.86)  x10^3/uL


 


Absolute Nucleated RBC     (0.00-0.012)  x10^3u/L


 


Lymphocytes %     (19.3-51.7)  %


 


Monocytes %     (4.7-12.5)  %


 


Eosinophils %     (0.7-5.8)  %


 


Basophils %     (0.1-1.2)  %


 


Absolute Granulocytes     (1.56-6.13)  x10^3/uL


 


Basophils #     (0.01-0.08)  x10^3/uL


 


D-Dimer     (0.0-0.50)  mg/L


 


Sodium     (135-145)  mmol/L


 


Potassium     (3.5-5.1)  mmol/L


 


Chloride     ()  mmol/L


 


Carbon Dioxide     (22-30)  mmol/L


 


Anion Gap     (5-15)  MEQ/L


 


BUN     (7-17)  mg/dL


 


Creatinine     (0.52-1.04)  mg/dL


 


Estimated GFR     ML/MIN


 


Glucose     ()  mg/dL


 


Lactic Acid   2.1 H   (0.4-2.0)  


 


Calcium     (8.4-10.2)  mg/dL


 


Total Bilirubin     (0.2-1.3)  mg/dL


 


AST     (14-36)  U/L


 


ALT     (0-35)  U/L


 


Alkaline Phosphatase     ()  U/L


 


Troponin I     (0.000-0.033)  ng/mL


 


NT-Pro-B Natriuret Pep     (<300)  pg/mL


 


Serum Total Protein     (6.3-8.2)  g/dL


 


Albumin     (3.5-5.0)  g/dL


 


Amylase     ()  U/L


 


Lipase     ()  U/L


 


Urine Color    Yellow  (Yellow)  


 


Urine Appearance    Clear  (Clear)  


 


Urine pH    6.5  (4.6-8.0)  


 


Ur Specific Gravity    <=1.005  (1.005-1.030)  


 


Urine Protein    Negative  (Negative)  


 


Urine Glucose (UA)    Negative  (Negative)  mg/dL


 


Urine Ketones    15 A  (Negative)  


 


Urine Blood    Negative  (Negative)  


 


Urine Nitrite    Negative  (Negative)  


 


Urine Bilirubin    Negative  (Negative)  


 


Urine Urobilinogen    1.0 A  (0.2)  mg/dL


 


Ur Leukocyte Esterase    Negative  (Negative)  


 


U Hyaline Cast (Auto)    NONE SEEN  (0-2)  /LPF


 


Urine Microscopic RBC    0-2  (0-5)  /HPF


 


Urine Microscopic WBC    3-5  (0-5)  /HPF


 


Ur Epithelial Cells    Few  (None Seen)  /HPF


 


Urine Bacteria    Moderate A  (None Seen)  /HPF


 


Urine Culture Reflexed    NO  (NO)  


 


Influenza Type A Ag  NEGATIVE    (NEGATIVE)  


 


Influenza Type B Ag  NEGATIVE    (NEGATIVE)  


 


RSV (PCR)  NEGATIVE    (NEGATIVE)  


 


SARS-CoV-2 (PCR)  NEGATIVE    (NEGATIVE)  














  01/19/25 01/19/25 01/19/25 Range/Units





  20:16 20:16 20:12 


 


WBC     (3.98-10.04)  x10^3/uL


 


RBC     (3.93-5.22)  x10^6/uL


 


Hgb     (11.2-15.7)  g/dL


 


Hct     (34.1-44.9)  %


 


MCV     (79.4-94.8)  fL


 


MCH     (25.6-32.2)  pg


 


MCHC     (32.2-35.5)  g/dL


 


RDW     (11.7-14.4)  %


 


Plt Count     (182-369)  x10^3/uL


 


MPV     (9.4-12.3)  fL


 


Gran %     (34.0-71.1)  %


 


Immature Gran % (Auto)     (0.001-0.429)  %


 


Nucleat RBC Rel Count     (0.00-0.2)  %


 


Eos # (Auto)     (0.04-0.36)  x10^3/uL


 


Immature Gran # (Auto)     (0.001-0.031)  x10^3u/L


 


Absolute Lymphs (auto)     (1.18-3.74)  x10^3/uL


 


Absolute Monos (auto)     (0.24-0.86)  x10^3/uL


 


Absolute Nucleated RBC     (0.00-0.012)  x10^3u/L


 


Lymphocytes %     (19.3-51.7)  %


 


Monocytes %     (4.7-12.5)  %


 


Eosinophils %     (0.7-5.8)  %


 


Basophils %     (0.1-1.2)  %


 


Absolute Granulocytes     (1.56-6.13)  x10^3/uL


 


Basophils #     (0.01-0.08)  x10^3/uL


 


D-Dimer   4.12 H*   (0.0-0.50)  mg/L


 


Sodium     (135-145)  mmol/L


 


Potassium     (3.5-5.1)  mmol/L


 


Chloride     ()  mmol/L


 


Carbon Dioxide     (22-30)  mmol/L


 


Anion Gap     (5-15)  MEQ/L


 


BUN     (7-17)  mg/dL


 


Creatinine     (0.52-1.04)  mg/dL


 


Estimated GFR     ML/MIN


 


Glucose     ()  mg/dL


 


Lactic Acid     (0.4-2.0)  


 


Calcium     (8.4-10.2)  mg/dL


 


Total Bilirubin     (0.2-1.3)  mg/dL


 


AST     (14-36)  U/L


 


ALT     (0-35)  U/L


 


Alkaline Phosphatase     ()  U/L


 


Troponin I    < 0.012  (0.000-0.033)  ng/mL


 


NT-Pro-B Natriuret Pep  < 20.0    (<300)  pg/mL


 


Serum Total Protein     (6.3-8.2)  g/dL


 


Albumin     (3.5-5.0)  g/dL


 


Amylase     ()  U/L


 


Lipase     ()  U/L


 


Urine Color     (Yellow)  


 


Urine Appearance     (Clear)  


 


Urine pH     (4.6-8.0)  


 


Ur Specific Gravity     (1.005-1.030)  


 


Urine Protein     (Negative)  


 


Urine Glucose (UA)     (Negative)  mg/dL


 


Urine Ketones     (Negative)  


 


Urine Blood     (Negative)  


 


Urine Nitrite     (Negative)  


 


Urine Bilirubin     (Negative)  


 


Urine Urobilinogen     (0.2)  mg/dL


 


Ur Leukocyte Esterase     (Negative)  


 


U Hyaline Cast (Auto)     (0-2)  /LPF


 


Urine Microscopic RBC     (0-5)  /HPF


 


Urine Microscopic WBC     (0-5)  /HPF


 


Ur Epithelial Cells     (None Seen)  /HPF


 


Urine Bacteria     (None Seen)  /HPF


 


Urine Culture Reflexed     (NO)  


 


Influenza Type A Ag     (NEGATIVE)  


 


Influenza Type B Ag     (NEGATIVE)  


 


RSV (PCR)     (NEGATIVE)  


 


SARS-CoV-2 (PCR)     (NEGATIVE)  














  01/19/25 01/19/25 01/19/25 Range/Units





  20:12 20:12 19:54 


 


WBC   11.2 H   (3.98-10.04)  x10^3/uL


 


RBC   4.19   (3.93-5.22)  x10^6/uL


 


Hgb   12.5   (11.2-15.7)  g/dL


 


Hct   37.0   (34.1-44.9)  %


 


MCV   88.3   (79.4-94.8)  fL


 


MCH   29.8   (25.6-32.2)  pg


 


MCHC   33.8   (32.2-35.5)  g/dL


 


RDW   13.1   (11.7-14.4)  %


 


Plt Count   308   (182-369)  x10^3/uL


 


MPV   10.3   (9.4-12.3)  fL


 


Gran %   84.6 H   (34.0-71.1)  %


 


Immature Gran % (Auto)   0.5 H   (0.001-0.429)  %


 


Nucleat RBC Rel Count   0.0   (0.00-0.2)  %


 


Eos # (Auto)   0.03 L   (0.04-0.36)  x10^3/uL


 


Immature Gran # (Auto)   0.06 H   (0.001-0.031)  x10^3u/L


 


Absolute Lymphs (auto)   1.26   (1.18-3.74)  x10^3/uL


 


Absolute Monos (auto)   0.35   (0.24-0.86)  x10^3/uL


 


Absolute Nucleated RBC   0.00   (0.00-0.012)  x10^3u/L


 


Lymphocytes %   11.2 L   (19.3-51.7)  %


 


Monocytes %   3.1 L   (4.7-12.5)  %


 


Eosinophils %   0.3 L   (0.7-5.8)  %


 


Basophils %   0.3   (0.1-1.2)  %


 


Absolute Granulocytes   9.49 H   (1.56-6.13)  x10^3/uL


 


Basophils #   0.03   (0.01-0.08)  x10^3/uL


 


D-Dimer     (0.0-0.50)  mg/L


 


Sodium  136    (135-145)  mmol/L


 


Potassium  3.3 L    (3.5-5.1)  mmol/L


 


Chloride  107    ()  mmol/L


 


Carbon Dioxide  19 L    (22-30)  mmol/L


 


Anion Gap  12.9    (5-15)  MEQ/L


 


BUN  3 L    (7-17)  mg/dL


 


Creatinine  0.51 L    (0.52-1.04)  mg/dL


 


Estimated GFR  131.9    ML/MIN


 


Glucose  102    ()  mg/dL


 


Lactic Acid    2.2 H  (0.4-2.0)  


 


Calcium  9.8    (8.4-10.2)  mg/dL


 


Total Bilirubin  0.80    (0.2-1.3)  mg/dL


 


AST  28    (14-36)  U/L


 


ALT  19    (0-35)  U/L


 


Alkaline Phosphatase  95    ()  U/L


 


Troponin I     (0.000-0.033)  ng/mL


 


NT-Pro-B Natriuret Pep     (<300)  pg/mL


 


Serum Total Protein  6.7    (6.3-8.2)  g/dL


 


Albumin  3.7    (3.5-5.0)  g/dL


 


Amylase  45    ()  U/L


 


Lipase  78    ()  U/L


 


Urine Color     (Yellow)  


 


Urine Appearance     (Clear)  


 


Urine pH     (4.6-8.0)  


 


Ur Specific Gravity     (1.005-1.030)  


 


Urine Protein     (Negative)  


 


Urine Glucose (UA)     (Negative)  mg/dL


 


Urine Ketones     (Negative)  


 


Urine Blood     (Negative)  


 


Urine Nitrite     (Negative)  


 


Urine Bilirubin     (Negative)  


 


Urine Urobilinogen     (0.2)  mg/dL


 


Ur Leukocyte Esterase     (Negative)  


 


U Hyaline Cast (Auto)     (0-2)  /LPF


 


Urine Microscopic RBC     (0-5)  /HPF


 


Urine Microscopic WBC     (0-5)  /HPF


 


Ur Epithelial Cells     (None Seen)  /HPF


 


Urine Bacteria     (None Seen)  /HPF


 


Urine Culture Reflexed     (NO)  


 


Influenza Type A Ag     (NEGATIVE)  


 


Influenza Type B Ag     (NEGATIVE)  


 


RSV (PCR)     (NEGATIVE)  


 


SARS-CoV-2 (PCR)     (NEGATIVE)  














- Progress


Progress: improved, re-examined


Progress Note: 





01/19/25 20:03


pt has failed home tx with doxylamine - discussed risks/benefit with pt and 

family of other antiemetics and they wish to try benadryl and phenergan 


01/19/25 22:52


pt continues to have recurring vomiting and we want to control this and correct 

K so this will require more time. 


01/20/25 00:13


DIscussed/ consulted with Dr. Kaiser her OB and with the normal O2 sat a 

clinically significant PE seems unlikely and more likely the D dimer is elevated

 as a combination of her pregnancy and the sinus infection - so it seems that a 

study to rule out PE would be less indicated. And her urine ketones are not too 

elevated so the dehydration of this level  often does not require admission.  

However we discussed the advantage of taking the time for more IVF and 

Hospitalist to insure vomiting is controlled and fluids restored and that will 

take another hour. 








I explained the D dimer situation and risk with the pt including risk/benefit 

for CT with radiation and she would prefer to hold off on the CT since her 

symptoms are mild and there are other causes for elevation of the screening d 

dimer test, and she does understand however that there still could be a risk for

 that pathology and has the capacity to make this choice. 


01/20/25 00:30





Pt began vomiting again . so we will try IV zofran.


01/20/25 00:47





Discussed with pt and this is the most problem she has had during the pregnancy 

with vomiting. She still cannot tolerate fluids so I will consult with the 

hospitalist to discuss options. The hospitalist is checking the house rules.  He

 called back ( Dr. Gilliam) and the hospital is checking with OB 


We are waiting for the call back.


We contacted Dr. Guardado  who agreed that the pt should be in on obs and 

rehydration. 








The nurses on the floor will contact Dr. Kaiser in the am to let him know we kept

 the pt after all.


Discussed with : Ean


Will see patient in: hospital (observation)


Counseled pt/family regarding: lab results, diagnosis, need for follow-up





Medical Desision Making





- Independent Historian


Additional History obtained from: Family





- Discussion of managment


Care discussed with:: specialist


Reviewed:: Test results, Need for additional workup


Agreed on:: Treatment plan, need for follow-up, decision to admit, place in obs





- Diagnostic Testing


Diagnostic test were ordered, analyzed, and reviewed by me: Yes





- Risk of complications


The pt has a mod risk of morbidity or mortality based on: Need for prescription 

drug management


The pt has a high risk of morbidity or mortality based on: Decision regarding 

hospitilization or escalation of hosp level of care





- Departure


Departure Disposition: Observation


Clinical Impression: 


 intractable vomiting during pregnancy





Condition: Good


Critical Care Time: No


Referrals: 


GERALD GUARDADO MD [Primary Care Provider] - Follow up/PCP as directed

## 2025-01-20 VITALS — OXYGEN SATURATION: 98 %

## 2025-01-20 VITALS
DIASTOLIC BLOOD PRESSURE: 50 MMHG | TEMPERATURE: 97.9 F | RESPIRATION RATE: 18 BRPM | SYSTOLIC BLOOD PRESSURE: 114 MMHG | HEART RATE: 90 BPM

## 2025-01-20 RX ADMIN — ONDANSETRON ONE MG: 2 INJECTION, SOLUTION INTRAMUSCULAR; INTRAVENOUS at 00:23

## 2025-01-20 RX ADMIN — FAMOTIDINE ONE MG: 10 INJECTION INTRAVENOUS at 09:56

## 2025-01-20 RX ADMIN — ONDANSETRON SCH MG: 2 INJECTION, SOLUTION INTRAMUSCULAR; INTRAVENOUS at 06:27

## 2025-01-20 RX ADMIN — ACETAMINOPHEN PRN MG: 325 TABLET ORAL at 10:06

## 2025-01-20 RX ADMIN — PROCHLORPERAZINE EDISYLATE PRN MG: 5 INJECTION INTRAMUSCULAR; INTRAVENOUS at 10:21

## 2025-01-20 NOTE — PCM.DCORD
- Discharge


Disposition: Home, Self-Care


Condition: Good


Prescriptions: 


New


   Prochlorperazine Maleate 5 mg* [Compazine 5 MG***] 5 mg PO Q6H PRN PRN #20 

tablet


     PRN Reason: Nausea





Continue


   Prenatal Vit No.179/Iron/Folic [Prenatal Tablet] 1 each PO DAILY


   Ondansetron ODT 4 MG*** [Zofran Odt 4 mg***] 1 tab PO Q6H PRN


     PRN Reason: Nausea


   Famotidine [Pepcid AC] 1 tab PO DAILY


   Doxylamine Succinate [Unisom] 1 tab PO Q6H PRN PRN


     PRN Reason: Nausea


Follow up with: 


GRANT PAGE DO [ACTIVE STAFF] - Call for Appointment

## 2025-01-20 NOTE — PCM.HP
History of Present Illness





- Chief Complaint


Chief Complaint: nausea and vomiting


History of Present Illness: 


 is a 25 year old female  at 26 3/7 wks EGA patient of Dr Kaiser,

she came to the ER last night for persistent nausea and vomiting, she has had 

trouble with nausea and vomiting during her pregnancy but became severe 

yesterday while at work. She denies cough, no urinary symptoms, no abd pain, no 

diarrhea. No other associated symptoms, just unable to tolerate po intake. she 

tolerated a popsicle this morning but complains of burning in her chest/throat 

region after.








- Review of Systems


Constitutional: No Fever, No Chills


Respiratory: No Cough, No Short Of Breath


Cardiac: No Chest Pain, No Edema, No Syncope


Abdominal/Gastrointestinal: Nausea, Vomiting, No Abdominal Pain, No Diarrhea, No

Constipation, No Dysphagia


Genitourinary Symptoms: No Dysuria


Skin: No Rash


All Other Systems: Reviewed and Negative





Medications & Allergies


Home Medications: 


                              Home Medication List





Prenatal Vit No.179/Iron/Folic [Prenatal Tablet] 1 each PO DAILY 11/15/24 

[History Confirmed 25]


Doxylamine Succinate [Unisom] 1 tab PO Q6H PRN PRN 25 [History Confirmed 

25]


Famotidine [Pepcid AC] 1 tab PO DAILY 25 [History Confirmed 25]


Ondansetron ODT 4 MG*** [Zofran Odt 4 mg***] 1 tab PO Q6H PRN 25 [History 

Confirmed 25]








Allergies/Adverse Reactions: 


                                    Allergies











Allergy/AdvReac Type Severity Reaction Status Date / Time


 


No Known Drug Allergies Allergy   Verified 11/15/24 01:19














- Past Medical History


Past Medical History: No


Neurological History: No Pertinent History


ENT History: No Pertinent History


Cardiac History: No Pertinent History


Respiratory History: No Pertinent History


Endocrine Medical History: No Pertinent History


Musculoskelatal History: No Pertinent History


GI Medical History: No Pertinent History


 History: No Pertinent History


Pyscho-Social History: No Pertinent History


Reproductive Disorders: No Pertinent History





- Female History


Are you pregnant now?: Yes


Gestational Age: 26 WKS





- Past Surgical History


Past Surgical History: No


Neuro Surgical History: No Pertinent History


Cardiac History: No Pertinent History


Respiratory Surgery: No Pertinent History


GI Surgical History: No Pertinent History


Genitourinary Surgical Hx: No Pertinent History


Musculskeletal Surgical Hx: No Pertinent History


Female Surgical History: No Pertinent History





- Social History


Smoking Status: Former smoker


How long have you smoked: 3 years


Exposure to second hand smoke: Yes


Alcohol: None


Drug Use: none





- Social Determinants of Health


Will the patient participate in the screening: Yes


Do you worry about a steady place to live?: No


Do you have any problems with any of the following?: No known problems


In the past 12 months,have you had to go without utilities?: No


Have you or anyone in your house had to go without enough: No


Transportation Issues: No


Has anyone in your support network made you feel unsafe?: No


Does the patient want assistance with any of the above?: No





- Physical Exam


Vital Signs: 


                               Vital Signs - 24 hr











  Temp Pulse Resp BP BP Pulse Ox


 


 25 08:00  99.2 F  99 H  18   105/55  97


 


 25 02:45  97.8 F  104 H  18  117/62  117/62  100


 


 25 02:00   96 H  20  119/62   100


 


 25 01:30   114 H  17  115/66   100


 


 25 01:26       98


 


 25 01:00   108 H  21  112/64   99


 


 25 00:30   99 H  20  121/73   100


 


 25 00:00   102 H  20  101/52   99


 


 25 23:30   91 H  21  125/78   100


 


 25 23:00   107 H  18  106/63   100


 


 25 22:30   100 H  26 H  116/71   100


 


 25 22:08   106 H  26 H  131/77   100


 


 25 21:30     115/73  


 


 25 21:29   95 H  21    100


 


 25 21:20   100 H  23    100


 


 25 21:10   95 H  17    100


 


 25 21:07   96 H  27 H   


 


 25 20:30     128/73  


 


 25 20:00   98 H  15  131/74   100


 


 25 19:36  98.5 F  111 H  22   122/71  99











General Appearance: no apparent distress


Neurologic Exam: alert, oriented x 3


Respiratory Exam: normal breath sounds, lungs clear, No respiratory distress


Cardiovascular Exam: regular rate/rhythm, normal heart sounds, normal peripheral

pulses


Gastrointestinal/Abdomen Exam: soft (gravid uterus, nontender.)


Back Exam: normal inspection, normal range of motion, No CVA tenderness


Extremity Exam: normal inspection, normal range of motion, pelvis stable


Skin Exam: normal color, warm, dry, No rash





Results





- Labs


Lab/Micro Results: 


                            Lab Results-Last 24 Hours











  25 Range/Units





  19:54 20:12 20:12 


 


WBC   11.2 H   (3.98-10.04)  x10^3/uL


 


RBC   4.19   (3.93-5.22)  x10^6/uL


 


Hgb   12.5   (11.2-15.7)  g/dL


 


Hct   37.0   (34.1-44.9)  %


 


MCV   88.3   (79.4-94.8)  fL


 


MCH   29.8   (25.6-32.2)  pg


 


MCHC   33.8   (32.2-35.5)  g/dL


 


RDW   13.1   (11.7-14.4)  %


 


Plt Count   308   (182-369)  x10^3/uL


 


MPV   10.3   (9.4-12.3)  fL


 


Gran %   84.6 H   (34.0-71.1)  %


 


Immature Gran % (Auto)   0.5 H   (0.001-0.429)  %


 


Nucleat RBC Rel Count   0.0   (0.00-0.2)  %


 


Eos # (Auto)   0.03 L   (0.04-0.36)  x10^3/uL


 


Immature Gran # (Auto)   0.06 H   (0.001-0.031)  x10^3u/L


 


Absolute Lymphs (auto)   1.26   (1.18-3.74)  x10^3/uL


 


Absolute Monos (auto)   0.35   (0.24-0.86)  x10^3/uL


 


Absolute Nucleated RBC   0.00   (0.00-0.012)  x10^3u/L


 


Lymphocytes %   11.2 L   (19.3-51.7)  %


 


Monocytes %   3.1 L   (4.7-12.5)  %


 


Eosinophils %   0.3 L   (0.7-5.8)  %


 


Basophils %   0.3   (0.1-1.2)  %


 


Absolute Granulocytes   9.49 H   (1.56-6.13)  x10^3/uL


 


Basophils #   0.03   (0.01-0.08)  x10^3/uL


 


D-Dimer     (0.0-0.50)  mg/L


 


Sodium    136  (135-145)  mmol/L


 


Potassium    3.3 L  (3.5-5.1)  mmol/L


 


Chloride    107  ()  mmol/L


 


Carbon Dioxide    19 L  (22-30)  mmol/L


 


Anion Gap    12.9  (5-15)  MEQ/L


 


BUN    3 L  (7-17)  mg/dL


 


Creatinine    0.51 L  (0.52-1.04)  mg/dL


 


Estimated GFR    131.9  ML/MIN


 


Glucose    102  ()  mg/dL


 


Lactic Acid  2.2 H    (0.4-2.0)  


 


Calcium    9.8  (8.4-10.2)  mg/dL


 


Total Bilirubin    0.80  (0.2-1.3)  mg/dL


 


AST    28  (14-36)  U/L


 


ALT    19  (0-35)  U/L


 


Alkaline Phosphatase    95  ()  U/L


 


Troponin I     (0.000-0.033)  ng/mL


 


NT-Pro-B Natriuret Pep     (<300)  pg/mL


 


Serum Total Protein    6.7  (6.3-8.2)  g/dL


 


Albumin    3.7  (3.5-5.0)  g/dL


 


Amylase    45  ()  U/L


 


Lipase    78  ()  U/L


 


Urine Color     (Yellow)  


 


Urine Appearance     (Clear)  


 


Urine pH     (4.6-8.0)  


 


Ur Specific Gravity     (1.005-1.030)  


 


Urine Protein     (Negative)  


 


Urine Glucose (UA)     (Negative)  mg/dL


 


Urine Ketones     (Negative)  


 


Urine Blood     (Negative)  


 


Urine Nitrite     (Negative)  


 


Urine Bilirubin     (Negative)  


 


Urine Urobilinogen     (0.2)  mg/dL


 


Ur Leukocyte Esterase     (Negative)  


 


U Hyaline Cast (Auto)     (0-2)  /LPF


 


Urine Microscopic RBC     (0-5)  /HPF


 


Urine Microscopic WBC     (0-5)  /HPF


 


Ur Epithelial Cells     (None Seen)  /HPF


 


Urine Bacteria     (None Seen)  /HPF


 


Urine Culture Reflexed     (NO)  


 


Influenza Type A Ag     (NEGATIVE)  


 


Influenza Type B Ag     (NEGATIVE)  


 


RSV (PCR)     (NEGATIVE)  


 


SARS-CoV-2 (PCR)     (NEGATIVE)  














  25 Range/Units





  20:12 20:16 20:16 


 


WBC     (3.98-10.04)  x10^3/uL


 


RBC     (3.93-5.22)  x10^6/uL


 


Hgb     (11.2-15.7)  g/dL


 


Hct     (34.1-44.9)  %


 


MCV     (79.4-94.8)  fL


 


MCH     (25.6-32.2)  pg


 


MCHC     (32.2-35.5)  g/dL


 


RDW     (11.7-14.4)  %


 


Plt Count     (182-369)  x10^3/uL


 


MPV     (9.4-12.3)  fL


 


Gran %     (34.0-71.1)  %


 


Immature Gran % (Auto)     (0.001-0.429)  %


 


Nucleat RBC Rel Count     (0.00-0.2)  %


 


Eos # (Auto)     (0.04-0.36)  x10^3/uL


 


Immature Gran # (Auto)     (0.001-0.031)  x10^3u/L


 


Absolute Lymphs (auto)     (1.18-3.74)  x10^3/uL


 


Absolute Monos (auto)     (0.24-0.86)  x10^3/uL


 


Absolute Nucleated RBC     (0.00-0.012)  x10^3u/L


 


Lymphocytes %     (19.3-51.7)  %


 


Monocytes %     (4.7-12.5)  %


 


Eosinophils %     (0.7-5.8)  %


 


Basophils %     (0.1-1.2)  %


 


Absolute Granulocytes     (1.56-6.13)  x10^3/uL


 


Basophils #     (0.01-0.08)  x10^3/uL


 


D-Dimer   4.12 H*   (0.0-0.50)  mg/L


 


Sodium     (135-145)  mmol/L


 


Potassium     (3.5-5.1)  mmol/L


 


Chloride     ()  mmol/L


 


Carbon Dioxide     (22-30)  mmol/L


 


Anion Gap     (5-15)  MEQ/L


 


BUN     (7-17)  mg/dL


 


Creatinine     (0.52-1.04)  mg/dL


 


Estimated GFR     ML/MIN


 


Glucose     ()  mg/dL


 


Lactic Acid     (0.4-2.0)  


 


Calcium     (8.4-10.2)  mg/dL


 


Total Bilirubin     (0.2-1.3)  mg/dL


 


AST     (14-36)  U/L


 


ALT     (0-35)  U/L


 


Alkaline Phosphatase     ()  U/L


 


Troponin I  < 0.012    (0.000-0.033)  ng/mL


 


NT-Pro-B Natriuret Pep    < 20.0  (<300)  pg/mL


 


Serum Total Protein     (6.3-8.2)  g/dL


 


Albumin     (3.5-5.0)  g/dL


 


Amylase     ()  U/L


 


Lipase     ()  U/L


 


Urine Color     (Yellow)  


 


Urine Appearance     (Clear)  


 


Urine pH     (4.6-8.0)  


 


Ur Specific Gravity     (1.005-1.030)  


 


Urine Protein     (Negative)  


 


Urine Glucose (UA)     (Negative)  mg/dL


 


Urine Ketones     (Negative)  


 


Urine Blood     (Negative)  


 


Urine Nitrite     (Negative)  


 


Urine Bilirubin     (Negative)  


 


Urine Urobilinogen     (0.2)  mg/dL


 


Ur Leukocyte Esterase     (Negative)  


 


U Hyaline Cast (Auto)     (0-2)  /LPF


 


Urine Microscopic RBC     (0-5)  /HPF


 


Urine Microscopic WBC     (0-5)  /HPF


 


Ur Epithelial Cells     (None Seen)  /HPF


 


Urine Bacteria     (None Seen)  /HPF


 


Urine Culture Reflexed     (NO)  


 


Influenza Type A Ag     (NEGATIVE)  


 


Influenza Type B Ag     (NEGATIVE)  


 


RSV (PCR)     (NEGATIVE)  


 


SARS-CoV-2 (PCR)     (NEGATIVE)  














  25 Range/Units





  21:23 22:11 22:55 


 


WBC     (3.98-10.04)  x10^3/uL


 


RBC     (3.93-5.22)  x10^6/uL


 


Hgb     (11.2-15.7)  g/dL


 


Hct     (34.1-44.9)  %


 


MCV     (79.4-94.8)  fL


 


MCH     (25.6-32.2)  pg


 


MCHC     (32.2-35.5)  g/dL


 


RDW     (11.7-14.4)  %


 


Plt Count     (182-369)  x10^3/uL


 


MPV     (9.4-12.3)  fL


 


Gran %     (34.0-71.1)  %


 


Immature Gran % (Auto)     (0.001-0.429)  %


 


Nucleat RBC Rel Count     (0.00-0.2)  %


 


Eos # (Auto)     (0.04-0.36)  x10^3/uL


 


Immature Gran # (Auto)     (0.001-0.031)  x10^3u/L


 


Absolute Lymphs (auto)     (1.18-3.74)  x10^3/uL


 


Absolute Monos (auto)     (0.24-0.86)  x10^3/uL


 


Absolute Nucleated RBC     (0.00-0.012)  x10^3u/L


 


Lymphocytes %     (19.3-51.7)  %


 


Monocytes %     (4.7-12.5)  %


 


Eosinophils %     (0.7-5.8)  %


 


Basophils %     (0.1-1.2)  %


 


Absolute Granulocytes     (1.56-6.13)  x10^3/uL


 


Basophils #     (0.01-0.08)  x10^3/uL


 


D-Dimer     (0.0-0.50)  mg/L


 


Sodium     (135-145)  mmol/L


 


Potassium     (3.5-5.1)  mmol/L


 


Chloride     ()  mmol/L


 


Carbon Dioxide     (22-30)  mmol/L


 


Anion Gap     (5-15)  MEQ/L


 


BUN     (7-17)  mg/dL


 


Creatinine     (0.52-1.04)  mg/dL


 


Estimated GFR     ML/MIN


 


Glucose     ()  mg/dL


 


Lactic Acid   2.1 H   (0.4-2.0)  


 


Calcium     (8.4-10.2)  mg/dL


 


Total Bilirubin     (0.2-1.3)  mg/dL


 


AST     (14-36)  U/L


 


ALT     (0-35)  U/L


 


Alkaline Phosphatase     ()  U/L


 


Troponin I     (0.000-0.033)  ng/mL


 


NT-Pro-B Natriuret Pep     (<300)  pg/mL


 


Serum Total Protein     (6.3-8.2)  g/dL


 


Albumin     (3.5-5.0)  g/dL


 


Amylase     ()  U/L


 


Lipase     ()  U/L


 


Urine Color  Yellow    (Yellow)  


 


Urine Appearance  Clear    (Clear)  


 


Urine pH  6.5    (4.6-8.0)  


 


Ur Specific Gravity  <=1.005    (1.005-1.030)  


 


Urine Protein  Negative    (Negative)  


 


Urine Glucose (UA)  Negative    (Negative)  mg/dL


 


Urine Ketones  15 A    (Negative)  


 


Urine Blood  Negative    (Negative)  


 


Urine Nitrite  Negative    (Negative)  


 


Urine Bilirubin  Negative    (Negative)  


 


Urine Urobilinogen  1.0 A    (0.2)  mg/dL


 


Ur Leukocyte Esterase  Negative    (Negative)  


 


U Hyaline Cast (Auto)  NONE SEEN    (0-2)  /LPF


 


Urine Microscopic RBC  0-2    (0-5)  /HPF


 


Urine Microscopic WBC  3-5    (0-5)  /HPF


 


Ur Epithelial Cells  Few    (None Seen)  /HPF


 


Urine Bacteria  Moderate A    (None Seen)  /HPF


 


Urine Culture Reflexed  NO    (NO)  


 


Influenza Type A Ag    NEGATIVE  (NEGATIVE)  


 


Influenza Type B Ag    NEGATIVE  (NEGATIVE)  


 


RSV (PCR)    NEGATIVE  (NEGATIVE)  


 


SARS-CoV-2 (PCR)    NEGATIVE  (NEGATIVE)  














Assessment/Plan


(1) Nausea and vomiting


Current Visit: No   Status: Acute   


Assessment & Plan: 


IV zofran and fluids, will switch to IV compazine if unable to tolerate po. will

advance to a bland diet.


Code(s): R11.2 - NAUSEA WITH VOMITING, UNSPECIFIED   





(2) GERD (gastroesophageal reflux disease)


Current Visit: Yes   Status: Acute   


Assessment & Plan: 


add IV pepcid


Code(s): K21.9 - GASTRO-ESOPHAGEAL REFLUX DISEASE WITHOUT ESOPHAGITIS   





(3) Pregnancy


Current Visit: Yes   Status: Acute   Code(s): Z34.90 - ENCNTR FOR SUPRVSN OF 

NORMAL PREGNANCY, UNSP, UNSP TRIMESTER

## 2025-04-19 ENCOUNTER — HOSPITAL ENCOUNTER (INPATIENT)
Dept: HOSPITAL 33 - OB | Age: 25
LOS: 2 days | Discharge: HOME | End: 2025-04-21
Attending: OBSTETRICS & GYNECOLOGY | Admitting: OBSTETRICS & GYNECOLOGY
Payer: COMMERCIAL

## 2025-04-19 DIAGNOSIS — R10.2: ICD-10-CM

## 2025-04-19 DIAGNOSIS — Z3A.39: ICD-10-CM

## 2025-04-19 LAB
ABO GROUP BLD: (no result)
AMPHET UR QL: NEGATIVE
BARBITURATES UR QL: NEGATIVE
BASOPHILS # BLD AUTO: 0.07 X10^3/UL (ref 0.01–0.08)
BASOPHILS NFR BLD AUTO: 0.6 % (ref 0.1–1.2)
BENZODIAZ UR QL SCN: NEGATIVE
COCAINE UR QL SCN: NEGATIVE
EOSINOPHIL # BLD AUTO: 0.13 X10^3/UL (ref 0.04–0.36)
HCT VFR BLD AUTO: 37.9 % (ref 34.1–44.9)
HGB BLD-MCNC: 12.3 G/DL (ref 11.2–15.7)
IMM GRANULOCYTES # BLD: 0.06 X10^3U/L (ref 0–0.03)
IMM GRANULOCYTES NFR BLD: 0.5 % (ref 0–0.43)
LYMPHOCYTES # SPEC AUTO: 4.46 X10^3/UL (ref 1.18–3.74)
MCH RBC QN AUTO: 27.6 PG (ref 25.6–32.2)
MCHC RBC AUTO-ENTMCNC: 32.5 G/DL (ref 32.2–35.5)
METHADONE UR QL: NEGATIVE
MONOCYTES # BLD AUTO: 0.72 X10^3/UL (ref 0.24–0.86)
OPIATES UR QL: NEGATIVE
PCP UR QL CFM>20 NG/ML: NEGATIVE
PLATELET # BLD AUTO: 299 X10^3/UL (ref 182–369)
RBC # BLD AUTO: 4.46 X10^6/UL (ref 3.93–5.22)
RBC # URNS HPF: (no result) /HPF (ref 0–5)
RH BLD: NEGATIVE
THC UR QL SCN: NEGATIVE
WBC # BLD AUTO: 11.7 X10^3/UL (ref 3.98–10.04)
WBC URNS QL MICRO: (no result) /HPF (ref 0–5)

## 2025-04-19 PROCEDURE — 86850 RBC ANTIBODY SCREEN: CPT

## 2025-04-19 PROCEDURE — 85025 COMPLETE CBC W/AUTO DIFF WBC: CPT

## 2025-04-19 PROCEDURE — 86900 BLOOD TYPING SEROLOGIC ABO: CPT

## 2025-04-19 PROCEDURE — 0HQ9XZZ REPAIR PERINEUM SKIN, EXTERNAL APPROACH: ICD-10-PCS | Performed by: OBSTETRICS & GYNECOLOGY

## 2025-04-19 PROCEDURE — 81002 URINALYSIS NONAUTO W/O SCOPE: CPT

## 2025-04-19 PROCEDURE — 99213 OFFICE O/P EST LOW 20 MIN: CPT

## 2025-04-19 PROCEDURE — 85461 HEMOGLOBIN FETAL: CPT

## 2025-04-19 PROCEDURE — 81001 URINALYSIS AUTO W/SCOPE: CPT

## 2025-04-19 PROCEDURE — 93970 EXTREMITY STUDY: CPT

## 2025-04-19 PROCEDURE — 86901 BLOOD TYPING SEROLOGIC RH(D): CPT

## 2025-04-19 PROCEDURE — 80307 DRUG TEST PRSMV CHEM ANLYZR: CPT

## 2025-04-19 PROCEDURE — 59426 ANTEPARTUM CARE ONLY: CPT

## 2025-04-19 PROCEDURE — 94799 UNLISTED PULMONARY SVC/PX: CPT

## 2025-04-19 PROCEDURE — 36415 COLL VENOUS BLD VENIPUNCTURE: CPT

## 2025-04-19 RX ADMIN — Medication SCH MLS/HR: at 14:43

## 2025-04-19 RX ADMIN — ONDANSETRON PRN MG: 2 INJECTION, SOLUTION INTRAMUSCULAR; INTRAVENOUS at 15:26

## 2025-04-19 RX ADMIN — BENZOCAINE AND LEVOMENTHOL PRN GM: 200; 5 SPRAY TOPICAL at 23:05

## 2025-04-19 RX ADMIN — HYDROCORTISONE PRN GM: 0.01 CREAM TOPICAL at 23:05

## 2025-04-19 RX ADMIN — Medication PRN PAD: at 23:06

## 2025-04-19 RX ADMIN — Medication SCH MLS/HR: at 15:16

## 2025-04-20 VITALS — RESPIRATION RATE: 18 BRPM

## 2025-04-20 LAB
BASOPHILS # BLD AUTO: 0.07 X10^3/UL (ref 0.01–0.08)
BASOPHILS NFR BLD AUTO: 0.3 % (ref 0.1–1.2)
EOSINOPHIL # BLD AUTO: 0.01 X10^3/UL (ref 0.04–0.36)
HCT VFR BLD AUTO: 32.9 % (ref 34.1–44.9)
HGB BLD-MCNC: 10.6 G/DL (ref 11.2–15.7)
IMM GRANULOCYTES # BLD: 0.15 X10^3U/L (ref 0–0.03)
IMM GRANULOCYTES NFR BLD: 0.7 % (ref 0–0.43)
LYMPHOCYTES # SPEC AUTO: 4.07 X10^3/UL (ref 1.18–3.74)
MCH RBC QN AUTO: 27.7 PG (ref 25.6–32.2)
MCHC RBC AUTO-ENTMCNC: 32.2 G/DL (ref 32.2–35.5)
MONOCYTES # BLD AUTO: 1.46 X10^3/UL (ref 0.24–0.86)
PLATELET # BLD AUTO: 256 X10^3/UL (ref 182–369)
RBC # BLD AUTO: 3.83 X10^6/UL (ref 3.93–5.22)
WBC # BLD AUTO: 21.3 X10^3/UL (ref 3.98–10.04)

## 2025-04-20 RX ADMIN — IBUPROFEN PRN MG: 400 TABLET ORAL at 02:13

## 2025-04-20 RX ADMIN — HUMAN RHO(D) IMMUNE GLOBULIN ONE MCG: 300 INJECTION, SOLUTION INTRAMUSCULAR at 11:34

## 2025-04-20 RX ADMIN — Medication SCH MG: at 09:11

## 2025-04-20 RX ADMIN — ACETAMINOPHEN PRN MG: 500 TABLET ORAL at 09:11

## 2025-04-20 RX ADMIN — DOCUSATE SODIUM SCH: 100 CAPSULE, LIQUID FILLED ORAL at 02:59

## 2025-04-21 VITALS
HEART RATE: 86 BPM | DIASTOLIC BLOOD PRESSURE: 83 MMHG | SYSTOLIC BLOOD PRESSURE: 135 MMHG | TEMPERATURE: 97.8 F | OXYGEN SATURATION: 97 %

## 2025-04-21 LAB
BASOPHILS # BLD AUTO: 0.07 X10^3/UL (ref 0.01–0.08)
BASOPHILS NFR BLD AUTO: 0.5 % (ref 0.1–1.2)
EOSINOPHIL # BLD AUTO: 0.14 X10^3/UL (ref 0.04–0.36)
HCT VFR BLD AUTO: 33.2 % (ref 34.1–44.9)
HGB BLD-MCNC: 10.4 G/DL (ref 11.2–15.7)
IMM GRANULOCYTES NFR BLD: 0.7 % (ref 0–0.43)
LYMPHOCYTES # SPEC AUTO: 4.16 X10^3/UL (ref 1.18–3.74)
MCH RBC QN AUTO: 27.6 PG (ref 25.6–32.2)
MCHC RBC AUTO-ENTMCNC: 31.3 G/DL (ref 32.2–35.5)
MONOCYTES # BLD AUTO: 0.84 X10^3/UL (ref 0.24–0.86)
PLATELET # BLD AUTO: 254 X10^3/UL (ref 182–369)
RBC # BLD AUTO: 3.77 X10^6/UL (ref 3.93–5.22)
WBC # BLD AUTO: 15.1 X10^3/UL (ref 3.98–10.04)